# Patient Record
Sex: FEMALE | Race: AMERICAN INDIAN OR ALASKA NATIVE
[De-identification: names, ages, dates, MRNs, and addresses within clinical notes are randomized per-mention and may not be internally consistent; named-entity substitution may affect disease eponyms.]

---

## 2018-05-18 ENCOUNTER — HOSPITAL ENCOUNTER (OUTPATIENT)
Dept: HOSPITAL 5 - TRG | Age: 18
Discharge: HOME | End: 2018-05-18
Attending: OBSTETRICS & GYNECOLOGY
Payer: COMMERCIAL

## 2018-05-18 DIAGNOSIS — O47.03: Primary | ICD-10-CM

## 2018-05-18 DIAGNOSIS — Z3A.34: ICD-10-CM

## 2018-05-18 LAB
ALBUMIN SERPL-MCNC: 3.1 G/DL (ref 3.9–5)
ALT SERPL-CCNC: 13 UNITS/L (ref 7–56)
BASOPHILS # (AUTO): 0 K/MM3 (ref 0–0.1)
BASOPHILS NFR BLD AUTO: 0.3 % (ref 0–1.8)
BILIRUB UR QL STRIP: (no result)
BLOOD UR QL VISUAL: (no result)
BUN SERPL-MCNC: 3 MG/DL (ref 7–17)
BUN/CREAT SERPL: 8 %
CALCIUM SERPL-MCNC: 8.5 MG/DL (ref 8.4–10.2)
EOSINOPHIL # BLD AUTO: 0 K/MM3 (ref 0–0.4)
EOSINOPHIL NFR BLD AUTO: 0.1 % (ref 0–4.3)
HCT VFR BLD CALC: 35.6 % (ref 30.3–42.9)
HEMOLYSIS INDEX: 11
HGB BLD-MCNC: 11.7 GM/DL (ref 10.1–14.3)
LIPASE SERPL-CCNC: 34 UNITS/L (ref 13–60)
LYMPHOCYTES # BLD AUTO: 1 K/MM3 (ref 1.2–5.4)
LYMPHOCYTES NFR BLD AUTO: 16.2 % (ref 13.4–35)
MCH RBC QN AUTO: 27 PG (ref 28–32)
MCHC RBC AUTO-ENTMCNC: 33 % (ref 30–34)
MCV RBC AUTO: 81 FL (ref 79–97)
MONOCYTES # (AUTO): 0.4 K/MM3 (ref 0–0.8)
MONOCYTES % (AUTO): 5.6 % (ref 0–7.3)
MUCOUS THREADS #/AREA URNS HPF: (no result) /HPF
PH UR STRIP: 7 [PH] (ref 5–7)
PLATELET # BLD: 188 K/MM3 (ref 140–440)
PROT UR STRIP-MCNC: (no result) MG/DL
RBC # BLD AUTO: 4.37 M/MM3 (ref 3.65–5.03)
RBC #/AREA URNS HPF: < 1 /HPF (ref 0–6)
UROBILINOGEN UR-MCNC: 2 MG/DL (ref ?–2)
WBC #/AREA URNS HPF: 1 /HPF (ref 0–6)

## 2018-05-18 PROCEDURE — 59025 FETAL NON-STRESS TEST: CPT

## 2018-05-18 PROCEDURE — 96360 HYDRATION IV INFUSION INIT: CPT

## 2018-05-18 PROCEDURE — 96361 HYDRATE IV INFUSION ADD-ON: CPT

## 2018-05-18 PROCEDURE — 85025 COMPLETE CBC W/AUTO DIFF WBC: CPT

## 2018-05-18 PROCEDURE — 81001 URINALYSIS AUTO W/SCOPE: CPT

## 2018-05-18 PROCEDURE — 82150 ASSAY OF AMYLASE: CPT

## 2018-05-18 PROCEDURE — 80053 COMPREHEN METABOLIC PANEL: CPT

## 2018-05-18 PROCEDURE — 36415 COLL VENOUS BLD VENIPUNCTURE: CPT

## 2018-05-18 PROCEDURE — 83690 ASSAY OF LIPASE: CPT

## 2018-05-29 ENCOUNTER — HOSPITAL ENCOUNTER (OUTPATIENT)
Dept: HOSPITAL 5 - TRG | Age: 18
Discharge: HOME | End: 2018-05-29
Attending: OBSTETRICS & GYNECOLOGY
Payer: COMMERCIAL

## 2018-05-29 VITALS — SYSTOLIC BLOOD PRESSURE: 108 MMHG | DIASTOLIC BLOOD PRESSURE: 73 MMHG

## 2018-05-29 DIAGNOSIS — Z3A.36: ICD-10-CM

## 2018-05-29 DIAGNOSIS — O47.03: Primary | ICD-10-CM

## 2018-05-29 LAB
BACTERIA #/AREA URNS HPF: (no result) /HPF
BILIRUB UR QL STRIP: (no result)
BLOOD UR QL VISUAL: (no result)
MUCOUS THREADS #/AREA URNS HPF: (no result) /HPF
PH UR STRIP: 6 [PH] (ref 5–7)
PROT UR STRIP-MCNC: (no result) MG/DL
RBC #/AREA URNS HPF: 3 /HPF (ref 0–6)
UROBILINOGEN UR-MCNC: < 2 MG/DL (ref ?–2)
WBC #/AREA URNS HPF: 1 /HPF (ref 0–6)

## 2018-05-29 PROCEDURE — 96361 HYDRATE IV INFUSION ADD-ON: CPT

## 2018-05-29 PROCEDURE — 59025 FETAL NON-STRESS TEST: CPT

## 2018-05-29 PROCEDURE — 96360 HYDRATION IV INFUSION INIT: CPT

## 2018-05-29 PROCEDURE — 81001 URINALYSIS AUTO W/SCOPE: CPT

## 2018-05-29 PROCEDURE — 96374 THER/PROPH/DIAG INJ IV PUSH: CPT

## 2018-05-29 PROCEDURE — 96375 TX/PRO/DX INJ NEW DRUG ADDON: CPT

## 2018-06-29 ENCOUNTER — HOSPITAL ENCOUNTER (OUTPATIENT)
Dept: HOSPITAL 5 - TRG | Age: 18
Discharge: HOME | End: 2018-06-29
Attending: OBSTETRICS & GYNECOLOGY
Payer: COMMERCIAL

## 2018-06-29 VITALS — DIASTOLIC BLOOD PRESSURE: 85 MMHG | SYSTOLIC BLOOD PRESSURE: 126 MMHG

## 2018-06-29 DIAGNOSIS — Z3A.40: ICD-10-CM

## 2018-06-29 DIAGNOSIS — O47.1: Primary | ICD-10-CM

## 2018-06-29 PROCEDURE — 59025 FETAL NON-STRESS TEST: CPT

## 2018-06-29 PROCEDURE — 76815 OB US LIMITED FETUS(S): CPT

## 2018-06-29 PROCEDURE — 76819 FETAL BIOPHYS PROFIL W/O NST: CPT

## 2018-06-29 NOTE — ULTRASOUND REPORT
LIMITED OB ULTRASOUND:



Fetal well-being, SANTANA, and deceleration.



Gestation: Peter

 SANTANA = 23.3 cm



Fetal Heart Rate:

  154 BPM



Estimated gestational age is 40 weeks 6 days.



BIOPHYSICAL PROFILE:



2 - Fetal breathing movements



2 - Fetal movements



2 - Fetal posture and tone



2 - Qualitative amniotic fluid volume



8 - TOTAL SCORE OF POSSIBLE 8



Fetal Heart Rate (bpm) 154

## 2018-06-30 ENCOUNTER — HOSPITAL ENCOUNTER (OUTPATIENT)
Dept: HOSPITAL 5 - TRG | Age: 18
Discharge: HOME | End: 2018-06-30
Attending: OBSTETRICS & GYNECOLOGY
Payer: COMMERCIAL

## 2018-06-30 VITALS — DIASTOLIC BLOOD PRESSURE: 78 MMHG | SYSTOLIC BLOOD PRESSURE: 106 MMHG

## 2018-06-30 DIAGNOSIS — O47.1: Primary | ICD-10-CM

## 2018-06-30 DIAGNOSIS — Z3A.41: ICD-10-CM

## 2018-06-30 PROCEDURE — 59025 FETAL NON-STRESS TEST: CPT

## 2018-07-01 ENCOUNTER — HOSPITAL ENCOUNTER (INPATIENT)
Dept: HOSPITAL 5 - TRG | Age: 18
LOS: 2 days | Discharge: HOME | End: 2018-07-03
Attending: OBSTETRICS & GYNECOLOGY | Admitting: OBSTETRICS & GYNECOLOGY
Payer: COMMERCIAL

## 2018-07-01 DIAGNOSIS — Z90.89: ICD-10-CM

## 2018-07-01 DIAGNOSIS — Z23: ICD-10-CM

## 2018-07-01 DIAGNOSIS — Z3A.41: ICD-10-CM

## 2018-07-01 PROCEDURE — 84550 ASSAY OF BLOOD/URIC ACID: CPT

## 2018-07-01 PROCEDURE — 86850 RBC ANTIBODY SCREEN: CPT

## 2018-07-01 PROCEDURE — 86592 SYPHILIS TEST NON-TREP QUAL: CPT

## 2018-07-01 PROCEDURE — 86900 BLOOD TYPING SEROLOGIC ABO: CPT

## 2018-07-01 PROCEDURE — 85014 HEMATOCRIT: CPT

## 2018-07-01 PROCEDURE — 82565 ASSAY OF CREATININE: CPT

## 2018-07-01 PROCEDURE — 86901 BLOOD TYPING SEROLOGIC RH(D): CPT

## 2018-07-01 PROCEDURE — 99211 OFF/OP EST MAY X REQ PHY/QHP: CPT

## 2018-07-01 PROCEDURE — 84460 ALANINE AMINO (ALT) (SGPT): CPT

## 2018-07-01 PROCEDURE — 83615 LACTATE (LD) (LDH) ENZYME: CPT

## 2018-07-01 PROCEDURE — 84450 TRANSFERASE (AST) (SGOT): CPT

## 2018-07-01 PROCEDURE — G0463 HOSPITAL OUTPT CLINIC VISIT: HCPCS

## 2018-07-01 PROCEDURE — 36415 COLL VENOUS BLD VENIPUNCTURE: CPT

## 2018-07-01 PROCEDURE — 85018 HEMOGLOBIN: CPT

## 2018-07-01 PROCEDURE — 81001 URINALYSIS AUTO W/SCOPE: CPT

## 2018-07-01 PROCEDURE — 85027 COMPLETE CBC AUTOMATED: CPT

## 2018-07-02 LAB
ALT SERPL-CCNC: 10 UNITS/L (ref 7–56)
ALT SERPL-CCNC: 9 UNITS/L (ref 7–56)
HCT VFR BLD CALC: 31 % (ref 30.3–42.9)
HCT VFR BLD CALC: 37.7 % (ref 30.3–42.9)
HCT VFR BLD CALC: 39.4 % (ref 30.3–42.9)
HGB BLD-MCNC: 10 GM/DL (ref 10.1–14.3)
HGB BLD-MCNC: 11.9 GM/DL (ref 10.1–14.3)
HGB BLD-MCNC: 13.1 GM/DL (ref 10.1–14.3)
MCH RBC QN AUTO: 26 PG (ref 28–32)
MCH RBC QN AUTO: 27 PG (ref 28–32)
MCHC RBC AUTO-ENTMCNC: 32 % (ref 30–34)
MCHC RBC AUTO-ENTMCNC: 33 % (ref 30–34)
MCV RBC AUTO: 80 FL (ref 79–97)
MCV RBC AUTO: 81 FL (ref 79–97)
PLATELET # BLD: 153 K/MM3 (ref 140–440)
PLATELET # BLD: 160 K/MM3 (ref 140–440)
RBC # BLD AUTO: 4.63 M/MM3 (ref 3.65–5.03)
RBC # BLD AUTO: 4.91 M/MM3 (ref 3.65–5.03)
URATE SERPL-MCNC: 4.9 MG/DL (ref 3.5–7.6)
URATE SERPL-MCNC: 5.4 MG/DL (ref 3.5–7.6)

## 2018-07-02 RX ADMIN — BUTORPHANOL TARTRATE PRN MG: 2 INJECTION, SOLUTION INTRAMUSCULAR; INTRAVENOUS at 03:22

## 2018-07-02 RX ADMIN — SODIUM CHLORIDE, SODIUM LACTATE, POTASSIUM CHLORIDE, AND CALCIUM CHLORIDE SCH MLS/HR: .6; .31; .03; .02 INJECTION, SOLUTION INTRAVENOUS at 00:57

## 2018-07-02 RX ADMIN — SODIUM CHLORIDE, SODIUM LACTATE, POTASSIUM CHLORIDE, AND CALCIUM CHLORIDE SCH MLS/HR: .6; .31; .03; .02 INJECTION, SOLUTION INTRAVENOUS at 01:49

## 2018-07-02 RX ADMIN — IBUPROFEN SCH MG: 600 TABLET, FILM COATED ORAL at 18:10

## 2018-07-02 RX ADMIN — DOCUSATE SODIUM SCH MG: 100 CAPSULE, LIQUID FILLED ORAL at 18:10

## 2018-07-02 RX ADMIN — HYDROCODONE BITARTRATE AND ACETAMINOPHEN PRN EACH: 5; 325 TABLET ORAL at 18:10

## 2018-07-02 RX ADMIN — BUTORPHANOL TARTRATE PRN MG: 2 INJECTION, SOLUTION INTRAMUSCULAR; INTRAVENOUS at 05:32

## 2018-07-02 NOTE — PROCEDURE NOTE
OB Delivery Note





- Delivery


Date of Delivery: 18


Surgeon: NAOMI SCHULTZ


Estimated blood loss: 500cc





- Vaginal


Delivery presentation: vertex


Delivery position: OA


Intrapartum events: none


Delivery induction: none


Delivery monitor: external FHT, external uterine


Route of delivery: 


Delivery placenta: spontaneous


Delivery cord: 3 umbilical vessels


Episiotomy: none


Delivery laceration: 1st degree


Delivery repair: vicryl


Anesthesia: none


Delivery comments: 


Patient was noted  and delivered a viable female infant at 0825 after 2 

pushes. Baby delivered OA presentation with shoulders delivering easily. the 

cord was clamped and cut. the placenta delivered intact. A viable female infant 

had apgars 8 and 9. with a weight of 8 pounds and 2 ounces. The perineum 

revealed  a 2nd degree lac repaired in normal usual fashion with a 2-0 vicryl. 

EBL 500cc. the sponges and needles count correct x2. Mother bonding with baby. 








- Infant


  ** A


Apgar at 1 minute: 8


Apgar at 5 minutes: 9


Infant Gender: Female

## 2018-07-02 NOTE — HISTORY AND PHYSICAL REPORT
History of Present Illness


Date of examination: 18


Date of admission: 


18 00:31





History of present illness: 





18 yo  LMP EDC 18 @ 41.2 weeks gestation presented to triage in active 

labor with SROM. Contractions since Friday. Late entry into care at 20 weeks 

gestation. Uncomplicated prenatal course. GBS negative. 





Past History


Past Medical History: no pertinent history


Past Surgical History: tonsillectomy


Family/Genetic History: diabetes, hypertension


Social history: no significant social history





- Obstetrical History


Expected Date of Delivery: 18


Actual Gestation: 41 Week(s) 2 Day(s) 


: 1





Medications and Allergies


 Allergies











Allergy/AdvReac Type Severity Reaction Status Date / Time


 


No Known Allergies Allergy   Unverified 18 12:48











 Home Medications











 Medication  Instructions  Recorded  Confirmed  Last Taken  Type


 


Pnv No.95/Ferrous Fum/Folic AC 1 tab PO DAILY 18 10:00 

History





[Prenatal Vitamins Tablet]     











Active Meds: 


Active Medications





Butorphanol Tartrate (Stadol)  2 mg IV Q2H PRN


   PRN Reason: Pain , Severe (7-10)


   Last Admin: 18 03:22 Dose:  2 mg


Ephedrine Sulfate (Ephedrine Sulfate)  10 mg IV Q2M PRN


   PRN Reason: Hypotension


Lactated Ringer's (Lactated Ringers)  1,000 mls @ 125 mls/hr IV AS DIRECT AMOR


   Last Admin: 18 01:49 Dose:  125 mls/hr


Oxytocin/Sodium Chloride (Pitocin/Ns 20 Unit/1000ml Drip)  20 units in 1,000 

mls @ 125 mls/hr IV AS DIRECT AMOR


Mineral Oil (Mineral Oil)  30 ml PO QHS PRN


   PRN Reason: Constipation


Terbutaline Sulfate (Brethine)  0.25 mg SUB-Q ONCE PRN


   PRN Reason: Hyperstimulation/Hypertonicity


Terbutaline Sulfate (Brethine)  0.25 mg IVP ONCE PRN


   PRN Reason: Hyperstimulation/Hypertonicity











Review of Systems


All systems: negative





- Vital Signs


Vital signs: 


 Vital Signs











Pulse Resp BP Pulse Ox


 


 109 H  20   130/94   100 


 


 18 00:00  18 00:00  18 00:00  07/02/18 00:00








 











Temp Pulse Resp BP Pulse Ox


 


 97.2 F L  107 H  19   135/96   98 


 


 18 01:04  18 04:12  18 03:22  18 04:12  18 04:08














- Physical Exam


Abdomen: Positive: normal appearance





- Obstetrical


FHR: category 1


Uterine Contraction Monitor Mode: External


Cervical Dilatation: 9


Cervical Effacement Percentage: 70


Fetal station: -1


Uterine Contraction Frequency (min): 1-3


Uterine Contraction Duration: 60


Uterine Contraction Pattern: Regular


Uterine Tone Measurement Phase: Resting


Uterine Contraction Intensity: Strong/Firm





Results


Result Diagrams: 


 18 00:30





 Abnormal lab results











  18 Range/Units





  00:30 


 


MCH  27 L  (28-32)  pg


 


RDW  16.3 H  (13.2-15.2)  %








All other labs normal.








Assessment and Plan





A: IUP at 41.2 weeks gestation


Transitional Labor


P: Await second stage labor

## 2018-07-03 VITALS — SYSTOLIC BLOOD PRESSURE: 117 MMHG | DIASTOLIC BLOOD PRESSURE: 92 MMHG

## 2018-07-03 LAB
BILIRUB UR QL STRIP: (no result)
BLOOD UR QL VISUAL: (no result)
PH UR STRIP: 5 [PH] (ref 5–7)
RBC #/AREA URNS HPF: > 182 /HPF (ref 0–6)
UROBILINOGEN UR-MCNC: < 2 MG/DL (ref ?–2)
WBC #/AREA URNS HPF: 38 /HPF (ref 0–6)

## 2018-07-03 PROCEDURE — 0KQM0ZZ REPAIR PERINEUM MUSCLE, OPEN APPROACH: ICD-10-PCS | Performed by: OBSTETRICS & GYNECOLOGY

## 2018-07-03 PROCEDURE — 3E0234Z INTRODUCTION OF SERUM, TOXOID AND VACCINE INTO MUSCLE, PERCUTANEOUS APPROACH: ICD-10-PCS | Performed by: OBSTETRICS & GYNECOLOGY

## 2018-07-03 RX ADMIN — IBUPROFEN SCH MG: 600 TABLET, FILM COATED ORAL at 06:14

## 2018-07-03 RX ADMIN — IBUPROFEN SCH MG: 600 TABLET, FILM COATED ORAL at 12:40

## 2018-07-03 RX ADMIN — SENNOSIDES AND DOCUSATE SODIUM SCH TAB: 50; 8.6 TABLET ORAL at 12:39

## 2018-07-03 RX ADMIN — SENNOSIDES AND DOCUSATE SODIUM SCH TAB: 50; 8.6 TABLET ORAL at 00:53

## 2018-07-03 RX ADMIN — SENNOSIDES AND DOCUSATE SODIUM SCH TAB: 50; 8.6 TABLET ORAL at 12:40

## 2018-07-03 RX ADMIN — DOCUSATE SODIUM SCH MG: 100 CAPSULE, LIQUID FILLED ORAL at 12:42

## 2018-07-03 RX ADMIN — IBUPROFEN SCH MG: 600 TABLET, FILM COATED ORAL at 00:39

## 2018-07-03 RX ADMIN — HYDROCODONE BITARTRATE AND ACETAMINOPHEN PRN EACH: 5; 325 TABLET ORAL at 10:48

## 2018-07-03 RX ADMIN — DOCUSATE SODIUM SCH MG: 100 CAPSULE, LIQUID FILLED ORAL at 00:53

## 2018-07-03 RX ADMIN — IBUPROFEN SCH MG: 600 TABLET, FILM COATED ORAL at 12:39

## 2018-07-03 NOTE — PROGRESS NOTE
Assessment and Plan





- Patient Problems


(1) Active labor at term


Current Visit: Yes   Status: Acute   


Plan to address problem: 


patient doing well


discharge home








Subjective





- Subjective


Date of service: 18


Interval history: 


Patient without any significant complaints. Pain well controlled. Decreased 

lochia.





Patient reports: appetite normal, voiding normally, pain well controlled


: doing well





Objective





- Vital Signs


Latest vital signs: 


 Vital Signs











  Temp Pulse Resp BP BP Pulse Ox


 


 18 08:02  98.9 F  109 H  18  126/96   96


 


 18 06:50    18   


 


 18 06:14    18   


 


 18 01:39    18   


 


 18 00:39    18   


 


 18 00:30  98.7 F  78  18   122/68 


 


 18 20:26    18   


 


 18 20:00  98.6 F  69  18   123/84 


 


 18 16:37  98.2 F  109 H  20   120/87 


 


 18 10:55  98.1 F  108 H  20   134/92 


 


 18 10:22   120 H   136/96  


 


 18 10:15   126 H   126/87  


 


 18 09:59   101 H   125/83  


 


 18 09:55   112 H   138/92  


 


 18 09:53   125 H   141/98  


 


 18 09:39   115 H   131/90  


 


 18 09:30   118 H   122/83  


 


 18 09:25   121 H   136/88  


 


 18 09:09   121 H   137/90  


 


 18 08:54   123 H   144/93  








 Intake and Output











 18





 22:59 06:59 14:59


 


Intake Total 660  


 


Output Total 300  


 


Balance 360  


 


Intake:   


 


  Oral 360  


 


  Intake, Free Water 300  


 


Output:   


 


  Urine 300  


 


    Void 300  


 


Other:   


 


  Total, Intake Amount 360  


 


  Total, Output Amount 300  


 


  # Voids   


 


    Void 1  














- Exam


Uterus: Present: normal, firm





- Labs


Labs: 


 Abnormal lab results











  18 Range/Units





  00:50 11:03 11:03 


 


WBC   14.9 H   (4.5-11.0)  K/mm3


 


Hgb     (10.1-14.3)  gm/dl


 


MCH   26 L   (28-32)  pg


 


RDW   16.8 H   (13.2-15.2)  %


 


Lactate Dehydrogenase    261 H  ()  units/L


 


Urine WBC (Auto)  38.0 H    (0.0-6.0)  /HPF














  18 Range/Units





  21:43 


 


WBC   (4.5-11.0)  K/mm3


 


Hgb  10.0 L  (10.1-14.3)  gm/dl


 


MCH   (28-32)  pg


 


RDW   (13.2-15.2)  %


 


Lactate Dehydrogenase   ()  units/L


 


Urine WBC (Auto)   (0.0-6.0)  /HPF

## 2018-07-03 NOTE — DISCHARGE SUMMARY
Providers





- Providers


Date of Admission: 


18 00:31





Date of discharge: 18


Attending physician: 


NAOMI SCHULTZ MD





Primary care physician: 


NAOMI SCHULTZ MD








Hospitalization


Reason for admission: active labor, rupture of membranes


Delivery: 


Discharge diagnosis: IUP at term delivered


[place] baby: female


Hospital course: 


Patient admitted after SROM. Had a . Postpartum uncomplicated





Condition at discharge: Good


Disposition: DC-01 TO HOME OR SELFCARE





- Discharge Diagnoses


(1) Active labor at term


Status: Acute   





Plan





- Discharge Medications


Prescriptions: 


HYDROcodone/APAP 5-325 [[place] 5/325] 1 each PO Q6HR PRN #30 tablet


 PRN Reason: Pain


Ibuprofen [Motrin] 800 mg PO Q8HR PRN #60 tablet


 PRN Reason: Pain, Mild (1-3)





- Provider Discharge Summary


Activity: no sex for 6 weeks, no heavy lifting 4 weeks, no strenuous exercise


Diet: routine


Instructions: routine


Additional instructions: 


[]  Smoking cessation referral if applicable(refer to patient education folder 

for contact #)


[]  Refer to [facility] Booklet








Call your doctor immediately for:


* Fever > 100.5


* Heavy vaginal bleeding ( >1 pad per hour)


* Severe persistent headache


* Shortness of breath


* Reddened, hot, painful area to leg or breast


* schedule followup in 4 weeks





- Follow up plan